# Patient Record
Sex: MALE | Race: WHITE | NOT HISPANIC OR LATINO | URBAN - METROPOLITAN AREA
[De-identification: names, ages, dates, MRNs, and addresses within clinical notes are randomized per-mention and may not be internally consistent; named-entity substitution may affect disease eponyms.]

---

## 2018-04-26 ENCOUNTER — EMERGENCY (EMERGENCY)
Facility: HOSPITAL | Age: 43
LOS: 1 days | End: 2018-04-26
Payer: COMMERCIAL

## 2018-04-26 PROCEDURE — 71046 X-RAY EXAM CHEST 2 VIEWS: CPT | Mod: 26

## 2018-04-26 PROCEDURE — 99284 EMERGENCY DEPT VISIT MOD MDM: CPT

## 2019-03-13 ENCOUNTER — TRANSCRIPTION ENCOUNTER (OUTPATIENT)
Age: 44
End: 2019-03-13

## 2019-12-07 ENCOUNTER — EMERGENCY (EMERGENCY)
Facility: HOSPITAL | Age: 44
LOS: 1 days | End: 2019-12-07
Admitting: EMERGENCY MEDICINE
Payer: OTHER GOVERNMENT

## 2019-12-07 PROCEDURE — 99284 EMERGENCY DEPT VISIT MOD MDM: CPT

## 2020-10-14 ENCOUNTER — TRANSCRIPTION ENCOUNTER (OUTPATIENT)
Age: 45
End: 2020-10-14

## 2020-10-21 ENCOUNTER — EMERGENCY (EMERGENCY)
Facility: HOSPITAL | Age: 45
LOS: 1 days | End: 2020-10-21
Admitting: EMERGENCY MEDICINE
Payer: OTHER GOVERNMENT

## 2020-10-21 PROCEDURE — 71275 CT ANGIOGRAPHY CHEST: CPT | Mod: 26

## 2020-10-21 PROCEDURE — 99285 EMERGENCY DEPT VISIT HI MDM: CPT

## 2020-10-21 PROCEDURE — 93010 ELECTROCARDIOGRAM REPORT: CPT

## 2020-10-21 PROCEDURE — 71045 X-RAY EXAM CHEST 1 VIEW: CPT | Mod: 26

## 2020-10-26 ENCOUNTER — EMERGENCY (EMERGENCY)
Facility: HOSPITAL | Age: 45
LOS: 1 days | End: 2020-10-26
Admitting: EMERGENCY MEDICINE
Payer: OTHER GOVERNMENT

## 2020-10-26 PROCEDURE — 93010 ELECTROCARDIOGRAM REPORT: CPT

## 2020-10-26 PROCEDURE — 99285 EMERGENCY DEPT VISIT HI MDM: CPT

## 2020-10-26 PROCEDURE — 99284 EMERGENCY DEPT VISIT MOD MDM: CPT

## 2020-10-26 PROCEDURE — 99053 MED SERV 10PM-8AM 24 HR FAC: CPT

## 2020-10-27 ENCOUNTER — APPOINTMENT (OUTPATIENT)
Dept: CARDIOLOGY | Facility: CLINIC | Age: 45
End: 2020-10-27
Payer: OTHER GOVERNMENT

## 2020-10-27 VITALS
SYSTOLIC BLOOD PRESSURE: 126 MMHG | TEMPERATURE: 98.2 F | OXYGEN SATURATION: 98 % | HEIGHT: 73 IN | HEART RATE: 94 BPM | WEIGHT: 213 LBS | DIASTOLIC BLOOD PRESSURE: 78 MMHG | BODY MASS INDEX: 28.23 KG/M2

## 2020-10-27 DIAGNOSIS — Z82.49 FAMILY HISTORY OF ISCHEMIC HEART DISEASE AND OTHER DISEASES OF THE CIRCULATORY SYSTEM: ICD-10-CM

## 2020-10-27 DIAGNOSIS — Z78.9 OTHER SPECIFIED HEALTH STATUS: ICD-10-CM

## 2020-10-27 PROBLEM — Z00.00 ENCOUNTER FOR PREVENTIVE HEALTH EXAMINATION: Status: ACTIVE | Noted: 2020-10-27

## 2020-10-27 PROCEDURE — 99244 OFF/OP CNSLTJ NEW/EST MOD 40: CPT

## 2020-10-27 PROCEDURE — 99072 ADDL SUPL MATRL&STAF TM PHE: CPT

## 2020-10-27 RX ORDER — SERTRALINE HYDROCHLORIDE 100 MG/1
100 TABLET, FILM COATED ORAL
Refills: 0 | Status: ACTIVE | COMMUNITY
Start: 2020-10-27

## 2020-10-27 NOTE — DISCUSSION/SUMMARY
[FreeTextEntry1] : Patient with anxiety and palpitations described as skipping beats.  At [facility] [place] in the ER, he was told that he has frequent PVCs.  He felt better after IV Lopressor and anxiolytics.\par \par I will start him with low-dose as needed metoprolol.  Check Holter recording for 48 hours\par \par Also, echocardiogram and ETT would be helpful.\par \par Follow-up after above tests.\par \par He may need low-dose long-term anxiolytic such as Klonopin other than Xanax for insomnia and anxiety related palpitations.  He will discuss this with Dr. Gallo\par \par Thank you for this referral and allowing me to participate in the care of this patient.  If can be of any further help or  if you have any questions, please do not hesitate to contact me\par \par \par Sincerely,\par \par Nick Huerta MD, FAC, [facility]

## 2020-10-27 NOTE — PHYSICAL EXAM
[General Appearance - Well Developed] : well developed [Normal Appearance] : normal appearance [Well Groomed] : well groomed [General Appearance - Well Nourished] : well nourished [No Deformities] : no deformities [General Appearance - In No Acute Distress] : no acute distress [Normal Conjunctiva] : the conjunctiva exhibited no abnormalities [Eyelids - No Xanthelasma] : the eyelids demonstrated no xanthelasmas [Normal Oral Mucosa] : normal oral mucosa [No Oral Pallor] : no oral pallor [No Oral Cyanosis] : no oral cyanosis [Heart Rate And Rhythm] : heart rate and rhythm were normal [Heart Sounds] : normal S1 and S2 [Murmurs] : no murmurs present [Arterial Pulses Normal] : the arterial pulses were normal [Edema] : no peripheral edema present [Respiration, Rhythm And Depth] : normal respiratory rhythm and effort [Exaggerated Use Of Accessory Muscles For Inspiration] : no accessory muscle use [Auscultation Breath Sounds / Voice Sounds] : lungs were clear to auscultation bilaterally [Abdomen Soft] : soft [Abdomen Tenderness] : non-tender [Abnormal Walk] : normal gait [Gait - Sufficient For Exercise Testing] : the gait was sufficient for exercise testing [Nail Clubbing] : no clubbing of the fingernails [Cyanosis, Localized] : no localized cyanosis [Skin Color & Pigmentation] : normal skin color and pigmentation [Skin Turgor] : normal skin turgor [] : no rash [Oriented To Time, Place, And Person] : oriented to person, place, and time [Affect] : the affect was normal [Mood] : the mood was normal [FreeTextEntry1] : He has anxiety

## 2020-10-27 NOTE — REASON FOR VISIT
[FreeTextEntry1] : Zeke is a 45-year-old male with history of anxiety and palpitations.  On 10/21/2020, he went to the ER at [facility] [place], [place] for significant increase in palpitations which he described as skipping heartbeats, every 5 to 10 seconds.  There was no associated lightheadedness, chest discomfort or shortness of breath.  Prior to going to the ER, he took Xanax which did not help.  In the ER, he was given IV Lopressor and Xanax which resulted in improvement in his palpitations.  EKG was unremarkable.  Labs are unremarkable except borderline white count.  CTA of the chest ruled out PE.\par \par No history of syncope with palpitations.\par \par Minimal coronary risk factors.\par \par He does not sleep well due to stress and anxiety.  He has cut back on caffeine.

## 2020-10-27 NOTE — ASSESSMENT
[FreeTextEntry1] : Reviewed today:\par -EKG 10/21/2020: Sinus rhythm, unremarkable\par -Labs 10/21/2020: White count 10.5, normal platelets.  D-dimer 300.  Negative BMP.  Normal magnesium and TSH.  Negative cardiac troponin.\par -CTA chest rule out PE.

## 2020-11-02 PROCEDURE — 99072 ADDL SUPL MATRL&STAF TM PHE: CPT

## 2020-11-02 PROCEDURE — 93224 XTRNL ECG REC UP TO 48 HRS: CPT

## 2020-11-18 ENCOUNTER — APPOINTMENT (OUTPATIENT)
Dept: CARDIOLOGY | Facility: CLINIC | Age: 45
End: 2020-11-18
Payer: OTHER GOVERNMENT

## 2020-11-18 PROCEDURE — 93015 CV STRESS TEST SUPVJ I&R: CPT

## 2020-11-18 PROCEDURE — 93306 TTE W/DOPPLER COMPLETE: CPT

## 2020-12-01 ENCOUNTER — APPOINTMENT (OUTPATIENT)
Dept: CARDIOLOGY | Facility: CLINIC | Age: 45
End: 2020-12-01
Payer: OTHER GOVERNMENT

## 2020-12-01 VITALS
OXYGEN SATURATION: 98 % | BODY MASS INDEX: 29.69 KG/M2 | WEIGHT: 224 LBS | DIASTOLIC BLOOD PRESSURE: 76 MMHG | HEIGHT: 73 IN | HEART RATE: 74 BPM | SYSTOLIC BLOOD PRESSURE: 110 MMHG | TEMPERATURE: 98 F

## 2020-12-01 PROCEDURE — 99072 ADDL SUPL MATRL&STAF TM PHE: CPT

## 2020-12-01 PROCEDURE — 99214 OFFICE O/P EST MOD 30 MIN: CPT

## 2020-12-01 RX ORDER — ALPRAZOLAM 0.5 MG/1
0.5 TABLET ORAL
Refills: 0 | Status: DISCONTINUED | COMMUNITY
Start: 2020-10-27 | End: 2020-12-01

## 2020-12-01 RX ORDER — ALPRAZOLAM 0.25 MG/1
0.25 TABLET ORAL
Qty: 60 | Refills: 0 | Status: ACTIVE | COMMUNITY
Start: 2020-10-08

## 2020-12-01 RX ORDER — TRAZODONE HYDROCHLORIDE 50 MG/1
50 TABLET ORAL
Qty: 30 | Refills: 0 | Status: ACTIVE | COMMUNITY
Start: 2020-09-10

## 2020-12-01 NOTE — DISCUSSION/SUMMARY
[FreeTextEntry1] : Patient with anxiety and palpitations described as skipping beats.  [place] [facility] [place] was told that he has frequent PVCs.  He felt better after IV Lopressor and anxiolytics.\par \par Continue with low-dose as needed metoprolol.  \par \par Echo showed LVH and dilated aortic root.  No history of hypertension.  Repeat echo in 6 months.  OV after that.  \par \par Suggested magnesium supplements.\par \par He may need low-dose long-term anxiolytic such as Klonopin other than Xanax for insomnia and anxiety related palpitations.  He will discuss this with Dr. Gallo\par \par Thank you for this referral and allowing me to participate in the care of this patient.  If can be of any further help or  if you have any questions, please do not hesitate to contact me\par \par \par Sincerely,\par \par Nick Huerta MD, FACC, [facility]

## 2020-12-01 NOTE — ASSESSMENT
[FreeTextEntry1] : Reviewed today:\par -EKG 10/21/2020: Sinus rhythm, unremarkable\par -Labs 10/21/2020: White count 10.5, normal platelets.  D-dimer 300.  Negative BMP.  Normal magnesium and TSH.  Negative cardiac troponin.\par -CTA chest ruled out PE.\par -Echo November 2020: Normal to hyperdynamic EF.  No MVP.  Aortic root 4.6 cm.  Normal diastolic function.  Mild LVH\par -Holter recording October 2020: Few PACs and PVCs.  Palpitations corresponded to PVC\par -ETT November 2020: Exercised 10 minutes.  No ischemia.  No arrhythmia except PVC in recovery

## 2020-12-01 NOTE — PHYSICAL EXAM
[General Appearance - Well Developed] : well developed [Normal Appearance] : normal appearance [Well Groomed] : well groomed [General Appearance - Well Nourished] : well nourished [No Deformities] : no deformities [General Appearance - In No Acute Distress] : no acute distress [Normal Conjunctiva] : the conjunctiva exhibited no abnormalities [Eyelids - No Xanthelasma] : the eyelids demonstrated no xanthelasmas [Normal Oral Mucosa] : normal oral mucosa [No Oral Pallor] : no oral pallor [No Oral Cyanosis] : no oral cyanosis [Respiration, Rhythm And Depth] : normal respiratory rhythm and effort [Exaggerated Use Of Accessory Muscles For Inspiration] : no accessory muscle use [Auscultation Breath Sounds / Voice Sounds] : lungs were clear to auscultation bilaterally [Heart Rate And Rhythm] : heart rate and rhythm were normal [Heart Sounds] : normal S1 and S2 [Murmurs] : no murmurs present [Arterial Pulses Normal] : the arterial pulses were normal [Edema] : no peripheral edema present [Abdomen Soft] : soft [Abdomen Tenderness] : non-tender [Abnormal Walk] : normal gait [Gait - Sufficient For Exercise Testing] : the gait was sufficient for exercise testing [Nail Clubbing] : no clubbing of the fingernails [Cyanosis, Localized] : no localized cyanosis [Skin Color & Pigmentation] : normal skin color and pigmentation [Skin Turgor] : normal skin turgor [] : no rash [Oriented To Time, Place, And Person] : oriented to person, place, and time [Affect] : the affect was normal [Mood] : the mood was normal [FreeTextEntry1] : He has anxiety

## 2020-12-01 NOTE — REASON FOR VISIT
[FreeTextEntry1] : Zeke is a 45-year-old male with history of anxiety and palpitations.  On 10/21/2020, he went to the ER at [facility] [place], [place] for significant increase in palpitations which he described as skipping heartbeats, every 5 to 10 seconds.  There was no associated lightheadedness, chest discomfort or shortness of breath.  Prior to going to the ER, he took Xanax which did not help.  In the ER, he was given IV Lopressor and Xanax which resulted in improvement in his palpitations.  EKG was unremarkable.  Labs are unremarkable except borderline white count.  CTA of the chest ruled out PE.\par \par No history of syncope with palpitations.  \par \par Palpitations have declined significantly since last office visit.  There were only few during his 24-hour Holter monitor in November 2020 and it corresponded to PVC activity.  He has good exercise capacity on ETT in November 2020 and no significant arrhythmias: Negative for ischemia\par \par Minimal coronary risk factors.\par \par He does not sleep well due to stress and anxiety.  He has cut back on caffeine.\par \par Echocardiogram showed normal LV function and wall motion, no MVP but dilated aortic root.

## 2021-05-25 ENCOUNTER — APPOINTMENT (OUTPATIENT)
Dept: CARDIOLOGY | Facility: CLINIC | Age: 46
End: 2021-05-25
Payer: OTHER GOVERNMENT

## 2021-05-25 VITALS
DIASTOLIC BLOOD PRESSURE: 88 MMHG | BODY MASS INDEX: 29.16 KG/M2 | HEART RATE: 83 BPM | SYSTOLIC BLOOD PRESSURE: 110 MMHG | HEIGHT: 73 IN | WEIGHT: 220 LBS | OXYGEN SATURATION: 98 %

## 2021-05-25 DIAGNOSIS — F41.1 GENERALIZED ANXIETY DISORDER: ICD-10-CM

## 2021-05-25 PROCEDURE — 93306 TTE W/DOPPLER COMPLETE: CPT

## 2021-05-25 PROCEDURE — 99214 OFFICE O/P EST MOD 30 MIN: CPT

## 2021-05-25 PROCEDURE — 99214 OFFICE O/P EST MOD 30 MIN: CPT | Mod: 25

## 2021-05-25 NOTE — PHYSICAL EXAM
[General Appearance - Well Developed] : well developed [Normal Appearance] : normal appearance [Well Groomed] : well groomed [General Appearance - Well Nourished] : well nourished [No Deformities] : no deformities [General Appearance - In No Acute Distress] : no acute distress [Normal Conjunctiva] : the conjunctiva exhibited no abnormalities [Eyelids - No Xanthelasma] : the eyelids demonstrated no xanthelasmas [Normal Oral Mucosa] : normal oral mucosa [No Oral Pallor] : no oral pallor [No Oral Cyanosis] : no oral cyanosis [Respiration, Rhythm And Depth] : normal respiratory rhythm and effort [Exaggerated Use Of Accessory Muscles For Inspiration] : no accessory muscle use [Auscultation Breath Sounds / Voice Sounds] : lungs were clear to auscultation bilaterally [Heart Rate And Rhythm] : heart rate and rhythm were normal [Heart Sounds] : normal S1 and S2 [Murmurs] : no murmurs present [Arterial Pulses Normal] : the arterial pulses were normal [Edema] : no peripheral edema present [Abdomen Soft] : soft [Abdomen Tenderness] : non-tender [Abnormal Walk] : normal gait [Gait - Sufficient For Exercise Testing] : the gait was sufficient for exercise testing [Nail Clubbing] : no clubbing of the fingernails [Cyanosis, Localized] : no localized cyanosis [Skin Color & Pigmentation] : normal skin color and pigmentation [] : no rash [Skin Turgor] : normal skin turgor [Oriented To Time, Place, And Person] : oriented to person, place, and time [Affect] : the affect was normal [Mood] : the mood was normal [FreeTextEntry1] : He has anxiety

## 2021-05-25 NOTE — ASSESSMENT
[FreeTextEntry1] : Reviewed today:\par -EKG 10/21/2020: Sinus rhythm, unremarkable\par -Labs 10/21/2020: White count 10.5, normal platelets.  D-dimer 300.  Negative BMP.  Normal magnesium and TSH.  Negative cardiac troponin.\par -CTA chest ruled out PE.\par -Echo November 2020: Normal to hyperdynamic EF.  No MVP.  Aortic root 4.6 cm.  Normal diastolic function.  Mild LVH\par -Holter recording October 2020: Few PACs and PVCs.  Palpitations corresponded to PVC\par -ETT November 2020: Exercised 10 minutes.  No ischemia.  No arrhythmia except PVC in recovery\par -Echocardiogram 5/25/2021: Normal EF.  Septum 1.2 cm.  Aortic  root 4.5 cm.

## 2021-05-25 NOTE — REASON FOR VISIT
[FreeTextEntry1] : Zeke is a 45-year-old male with history of anxiety and palpitations.  On 10/21/2020, he went [place] [facility] [place] in palpitations which he described as skipping heartbeats, every 5 to 10 seconds.  There was no associated lightheadedness, chest discomfort or shortness of breath.  Prior to going to the ER, he took Xanax which did not help.  In the ER, he was given IV Lopressor and Xanax which resulted in improvement in his palpitations.  EKG was unremarkable.  Labs are unremarkable except borderline white count.  CTA of the chest ruled out PE.\par \par No history of syncope with palpitations.  \par \par Palpitations have declined significantly since last office visit.  There were only few during his 24-hour Holter monitor in November 2020 and it corresponded to PVC activity.  He has good exercise capacity on ETT in November 2020 and no significant arrhythmias: Negative for ischemia\par \par Echo cardiogram on 5/25/2021 was unremarkable.  Aortic root size is 4.5 cm, unchanged.\par Minimal coronary risk factors.\par \par He does not sleep well due to stress and anxiety.  He has cut back on caffeine.\par \par Echocardiogram showed normal LV function and wall motion, no MVP but dilated aortic root.

## 2021-08-10 ENCOUNTER — TRANSCRIPTION ENCOUNTER (OUTPATIENT)
Age: 46
End: 2021-08-10

## 2022-05-09 ENCOUNTER — RESULT CHARGE (OUTPATIENT)
Age: 47
End: 2022-05-09

## 2022-05-10 ENCOUNTER — APPOINTMENT (OUTPATIENT)
Dept: CARDIOLOGY | Facility: CLINIC | Age: 47
End: 2022-05-10
Payer: OTHER GOVERNMENT

## 2022-05-10 VITALS
BODY MASS INDEX: 28.49 KG/M2 | DIASTOLIC BLOOD PRESSURE: 82 MMHG | OXYGEN SATURATION: 95 % | WEIGHT: 215 LBS | SYSTOLIC BLOOD PRESSURE: 124 MMHG | TEMPERATURE: 97.8 F | HEART RATE: 99 BPM | RESPIRATION RATE: 14 BRPM | HEIGHT: 73 IN

## 2022-05-10 DIAGNOSIS — Z82.49 FAMILY HISTORY OF ISCHEMIC HEART DISEASE AND OTHER DISEASES OF THE CIRCULATORY SYSTEM: ICD-10-CM

## 2022-05-10 PROCEDURE — 99214 OFFICE O/P EST MOD 30 MIN: CPT

## 2022-05-10 PROCEDURE — 93306 TTE W/DOPPLER COMPLETE: CPT

## 2022-05-10 PROCEDURE — 99214 OFFICE O/P EST MOD 30 MIN: CPT | Mod: 25

## 2022-05-10 RX ORDER — METOPROLOL TARTRATE 25 MG/1
25 TABLET, FILM COATED ORAL
Qty: 15 | Refills: 0 | Status: DISCONTINUED | COMMUNITY
Start: 2020-10-27 | End: 2022-05-10

## 2022-05-10 NOTE — REASON FOR VISIT
[FreeTextEntry1] : Zeke is a 46-year-old male with history of HLD, anxiety and palpitations.  \par \par Last Visit was 5/2021. Today May 10, 2022 he present for his yearly follow up and to review his Echo from today. He denies chest pain, pressure, unusual shortness of breath, SHARIF, orthopnea, LE edema, lightheadedness, dizziness, near syncope or syncope. He does continue to have palpitations however these have decreased in significance. He is taking his Metoprolol Tartrate daily and not prn anymore. \par No history of syncope with palpitations.  \par \par He has a significant family Hx of Premature CAD. Father MI at age 36\par \par Historical perspective:\par On 10/21/2020, he went to the ER at [facility] [place] significant increase in palpitations which he described as skipping heartbeats, every 5 to 10 seconds.  There was no associated lightheadedness, chest discomfort or shortness of breath.  Prior to going to the ER, he took Xanax which did not help.  In the ER, he was given IV Lopressor and Xanax which resulted in improvement in his palpitations.  EKG was unremarkable.\par   Labs are unremarkable except borderline white count.  CTA of the chest ruled out PE.\par \par \par \par \par

## 2022-05-10 NOTE — DISCUSSION/SUMMARY
[FreeTextEntry1] : CHAO LEE is a 46 year old M who presents today May 10, 2022 with the above history and the following active issues: \par \par Palpitations: will start on Metoprolol Succ 25mg daily. He was on Metop Tartrate prn but he was taking daily at night. Palps have decreased sig. with medication. I do believe Metop Succ will also improve with longer control. \par \par Hx Dilated Aortic Root\par On todays Echo Aortic root 4.6cm. Previous was 4.5cm.  It should be noted the patient is 6 foot 1 inch tall.\par On Echo today also MVP noted. BP is well controlled at todays visit  \par \par Keep a close watch on blood pressure.  Avoid heavy weight lifting or weight gain. \par \par HLD/Fam Hx CaD\par  on most recent labs. Start on Lipitor 20mg daily. Will obtain CT calcium score as well.\par Dietary modifications discussed in detail, weight loss and cardiovascular exercise. \par Repeat labs in 6 weeks\par Reviewed with patient most recent labs and testing. All questions answered. \par \par Follow up in 8 weeks- review labs, tolerance to statins and CT Calcium Score\par \par Thank you for this referral and allowing me to participate in the care of this patient.  If can be of any further help or  if you have any questions, please do not hesitate to contact me\par \par Sincerely,\par \par Alina Mcgee ANP-C\par Patients history, testing, and plan reviewed with supervising MD: Dr. Nick Huerta MD, [facility], [facility]

## 2022-05-10 NOTE — PHYSICAL EXAM
[General Appearance - Well Developed] : well developed [Normal Appearance] : normal appearance [Well Groomed] : well groomed [General Appearance - Well Nourished] : well nourished [No Deformities] : no deformities [General Appearance - In No Acute Distress] : no acute distress [Normal Oral Mucosa] : normal oral mucosa [No Oral Pallor] : no oral pallor [No Oral Cyanosis] : no oral cyanosis [Respiration, Rhythm And Depth] : normal respiratory rhythm and effort [Exaggerated Use Of Accessory Muscles For Inspiration] : no accessory muscle use [Auscultation Breath Sounds / Voice Sounds] : lungs were clear to auscultation bilaterally [Heart Rate And Rhythm] : heart rate and rhythm were normal [Heart Sounds] : normal S1 and S2 [Murmurs] : no murmurs present [Arterial Pulses Normal] : the arterial pulses were normal [Edema] : no peripheral edema present [Abnormal Walk] : normal gait [Gait - Sufficient For Exercise Testing] : the gait was sufficient for exercise testing [Nail Clubbing] : no clubbing of the fingernails [Cyanosis, Localized] : no localized cyanosis [Skin Color & Pigmentation] : normal skin color and pigmentation [Skin Turgor] : normal skin turgor [] : no rash [Oriented To Time, Place, And Person] : oriented to person, place, and time [Affect] : the affect was normal [Mood] : the mood was normal [FreeTextEntry1] : He has anxiety

## 2022-05-10 NOTE — CARDIOLOGY SUMMARY
[de-identified] : 10/2020: NSR 98 bpm [de-identified] : 10/2020 SR Avg 80 Max 116 bpm. PACs noted/PVCs [de-identified] : 11/2020 ETT: Exercise 10:00 11 METS No evidence for ischemia by EKG [de-identified] : 5/10/2022 EF 60-65%  Mitral valve prolapse involving the anterior mitral leaflet. Aortic Root 4.6cm Normal LV systolic function and no seg. wall motion abnormalities. Normal PASP. \par 5/2021: EF 60% Buckling MVL  Aortic root 4.5cm Normal LV systolic function and no seg. wall motion abnormalities. Normal PASP. \par 11/2020: EF >65% Aortic Root 4.6 Hyperdynamic LVSF. Mild LVH [de-identified] : Labs: 12/2021 Total Chol 229 Trig 186  A1C 5.9% TSH 0.914 HGB 14.8 HCT 44.9 Creat 0.91

## 2022-07-12 ENCOUNTER — APPOINTMENT (OUTPATIENT)
Dept: CARDIOLOGY | Facility: CLINIC | Age: 47
End: 2022-07-12

## 2022-07-12 VITALS
WEIGHT: 230 LBS | BODY MASS INDEX: 30.35 KG/M2 | DIASTOLIC BLOOD PRESSURE: 74 MMHG | SYSTOLIC BLOOD PRESSURE: 116 MMHG | TEMPERATURE: 97.8 F | OXYGEN SATURATION: 6 % | HEART RATE: 67 BPM

## 2022-07-12 PROCEDURE — 99213 OFFICE O/P EST LOW 20 MIN: CPT

## 2022-07-12 NOTE — DISCUSSION/SUMMARY
[FreeTextEntry1] : CHAO LEE is a 46 year old M who presents today with  the following active issues: \par \par Palpitations: cont Metoprolol Succ 25mg daily. Has sig decrease in palpitations. BP controlled on meds\par \par Hx Dilated Aortic Root\par Echo Aortic root 4.6cm. Previous was 4.5cm.  It should be noted the patient is 6 foot 1 inch tall.\par On Echo today also MVP noted. BP is well controlled at todays visit  \par \par Keep a close watch on blood pressure.  Avoid heavy weight lifting or weight gain. \par \par HLD/Fam Hx CaD\par LDL 62 on most recent labs. Cont Lipitor 20mg daily. Tolerating well. Ct Calcium Score was 0 from 4/2022\par Dietary modifications discussed in detail, weight loss and cardiovascular exercise. \par \par Thank you for this referral and allowing me to participate in the care of this patient.  If can be of any further help or  if you have any questions, please do not hesitate to contact me\par \par Return to office in 1 year for echocardiogram and follow-up or sooner if needed\par F/U after testing to review results.\par Discussed red flag symptoms, which would warrant sooner or emergent medical evaluation.\par Any questions and concerns were addressed and resolved. \par Sincerely,\par \par Alina MOEC\par Patients history, testing, and plan reviewed with supervising MD: Dr. Nick Huerta MD, [facility], [facility]

## 2022-07-12 NOTE — CARDIOLOGY SUMMARY
[de-identified] : 10/2020: NSR 98 bpm [de-identified] : 10/2020 SR Avg 80 Max 116 bpm. PACs noted/PVCs [de-identified] : 11/2020 ETT: Exercise 10:00 11 METS No evidence for ischemia by EKG [de-identified] : 5/10/2022 EF 60-65%  Mitral valve prolapse involving the anterior mitral leaflet. Aortic Root 4.6cm Normal LV systolic function and no seg. wall motion abnormalities. Normal PASP. \par 5/2021: EF 60% Buckling MVL  Aortic root 4.5cm Normal LV systolic function and no seg. wall motion abnormalities. Normal PASP. \par 11/2020: EF >65% Aortic Root 4.6 Hyperdynamic LVSF. Mild LVH [de-identified] : Ct Calcium Score 6/7/2022: Zero [de-identified] : Labs: 12/2021 Total Chol 229 Trig 186  A1C 5.9% TSH 0.914 HGB 14.8 HCT 44.9 Creat 0.91 \par Labs: 6/10/2022: Total cholesterol 150 triglycerides 205 LDL 62 A1c 5.8

## 2022-07-12 NOTE — PHYSICAL EXAM
[Normal] : clear lung fields, good air entry, no respiratory distress [Normal Gait] : normal gait [No Edema] : no edema [No Rash] : no rash [Moves all extremities] : moves all extremities [Normal Speech] : normal speech [Alert and Oriented] : alert and oriented

## 2022-07-12 NOTE — REASON FOR VISIT
[FreeTextEntry1] : Zeke is a 46-year-old male with history of HLD, anxiety and palpitations.  \par \par Last Visit was  May 10, 2022. Today he present for a follow up after starting Metoprolol Succ daily for his palpitations. He denies chest pain, pressure, unusual shortness of breath, SHARIF, orthopnea, LE edema, lightheadedness, dizziness, near syncope or syncope. He does continue to have intermittent palpitations however these have decreased in significance.\par He was also started on atorvastatin which she is tolerating well.  Repeat lipid profile showing significant decrease in LDL.\par \par No history of syncope with palpitations.  \par \par He has a significant family Hx of Premature CAD. Father MI at age 36\par \par Historical perspective:\par On 10/21/2020, he went to the ER at [facility] [place] for significant increase in palpitations which he described as skipping heartbeats, every 5 to 10 seconds.  There was no associated lightheadedness, chest discomfort or shortness of breath.  Prior to going to the ER, he took Xanax which did not help.  In the ER, he was given IV Lopressor and Xanax which resulted in improvement in his palpitations.  EKG was unremarkable.\par   Labs are unremarkable except borderline white count.  CTA of the chest ruled out PE.

## 2023-01-17 ENCOUNTER — NON-APPOINTMENT (OUTPATIENT)
Age: 48
End: 2023-01-17

## 2023-02-03 RX ORDER — METOPROLOL SUCCINATE 25 MG/1
25 TABLET, EXTENDED RELEASE ORAL DAILY
Qty: 90 | Refills: 3 | Status: ACTIVE | COMMUNITY
Start: 2022-05-10 | End: 1900-01-01

## 2023-03-30 ENCOUNTER — NON-APPOINTMENT (OUTPATIENT)
Age: 48
End: 2023-03-30

## 2023-04-27 ENCOUNTER — APPOINTMENT (OUTPATIENT)
Dept: INFECTIOUS DISEASE | Facility: CLINIC | Age: 48
End: 2023-04-27
Payer: OTHER GOVERNMENT

## 2023-04-27 VITALS
SYSTOLIC BLOOD PRESSURE: 130 MMHG | OXYGEN SATURATION: 95 % | DIASTOLIC BLOOD PRESSURE: 90 MMHG | BODY MASS INDEX: 31.14 KG/M2 | HEART RATE: 80 BPM | TEMPERATURE: 99.1 F | HEIGHT: 73 IN | WEIGHT: 235 LBS

## 2023-04-27 DIAGNOSIS — M70.32 OTHER BURSITIS OF ELBOW, LEFT ELBOW: ICD-10-CM

## 2023-04-27 DIAGNOSIS — Z82.49 FAMILY HISTORY OF ISCHEMIC HEART DISEASE AND OTHER DISEASES OF THE CIRCULATORY SYSTEM: ICD-10-CM

## 2023-04-27 DIAGNOSIS — Z86.59 PERSONAL HISTORY OF OTHER MENTAL AND BEHAVIORAL DISORDERS: ICD-10-CM

## 2023-04-27 DIAGNOSIS — Z83.438 FAMILY HISTORY OF OTHER DISORDER OF LIPOPROTEIN METABOLISM AND OTHER LIPIDEMIA: ICD-10-CM

## 2023-04-27 DIAGNOSIS — Z78.9 OTHER SPECIFIED HEALTH STATUS: ICD-10-CM

## 2023-04-27 DIAGNOSIS — L73.9 FOLLICULAR DISORDER, UNSPECIFIED: ICD-10-CM

## 2023-04-27 DIAGNOSIS — Z87.09 PERSONAL HISTORY OF OTHER DISEASES OF THE RESPIRATORY SYSTEM: ICD-10-CM

## 2023-04-27 PROCEDURE — 99213 OFFICE O/P EST LOW 20 MIN: CPT

## 2023-04-27 NOTE — PHYSICAL EXAM
[General Appearance - Alert] : alert [General Appearance - In No Acute Distress] : in no acute distress [PERRL With Normal Accommodation] : pupils were equal in size, round, reactive to light [Sclera] : the sclera and conjunctiva were normal [Extraocular Movements] : extraocular movements were intact [Outer Ear] : the ears and nose were normal in appearance [Oropharynx] : the oropharynx was normal with no thrush [Neck Appearance] : the appearance of the neck was normal [Neck Cervical Mass (___cm)] : no neck mass was observed [Jugular Venous Distention Increased] : there was no jugular-venous distention [Thyroid Diffuse Enlargement] : the thyroid was not enlarged [Auscultation Breath Sounds / Voice Sounds] : lungs were clear to auscultation bilaterally [Heart Rate And Rhythm] : heart rate was normal and rhythm regular [Heart Sounds] : normal S1 and S2 [Heart Sounds Gallop] : no gallops [Murmurs] : no murmurs [Heart Sounds Pericardial Friction Rub] : no pericardial rub [Full Pulse] : the pedal pulses are present [Edema] : there was no peripheral edema [Bowel Sounds] : normal bowel sounds [Abdomen Soft] : soft [Abdomen Tenderness] : non-tender [Abdomen Mass (___ Cm)] : no abdominal mass palpated [Costovertebral Angle Tenderness] : no CVA tenderness [No Palpable Adenopathy] : no palpable adenopathy [Musculoskeletal - Swelling] : no joint swelling [Nail Clubbing] : no clubbing  or cyanosis of the fingernails [Motor Tone] : muscle strength and tone were normal [Skin Color & Pigmentation] : normal skin color and pigmentation [] : no rash [FreeTextEntry1] : Left elbow with slight fullness, mildly tender to palpation.  On the bilateral corners of the mouth there is a little bit of fissuring, and fullness with induration. [Deep Tendon Reflexes (DTR)] : deep tendon reflexes were 2+ and symmetric [Sensation] : the sensory exam was normal to light touch and pinprick [No Focal Deficits] : no focal deficits [Oriented To Time, Place, And Person] : oriented to person, place, and time [Affect] : the affect was normal

## 2023-04-27 NOTE — ASSESSMENT
[FreeTextEntry1] : This 47-year-old\par \par Man who had been treated for a left elbow cellulitis, folliculitis.  I explained to the patient that he still has some signs of a follicular inflammation around the corners of the mouth.  Which might be related to his close shaving leading to his ingrown hairs.  Explained that ingrown hairs do have a sharp edge and cuts into the skin.\par \par I have advised the patient that I will extend his duration of the doxycycline that he had in the hospital for another 2 weeks.\par \par I have advised him of sun protection while on doxycycline.\par \par I also advised the patient to use a chlorhexidine/Hibiclens solution during showering to help decolonize of the staph bacteria and suspected MRSA.\par \par \par He can follow-up this office as needed.

## 2023-04-27 NOTE — HISTORY OF PRESENT ILLNESS
[FreeTextEntry1] : This is a 47-year-old man that was seen in the hospital last seen on April 6, 2023.  He had left elbow cellulitis, anxiety on SSRI left olecranon bursitis patient MRSA colonization.  He was supposed to complete 2 more days of clindamycin and doxycycline for 7 more days ending on April 5, 2023.\par \par Since the visit in the hospital, patient reports that his elbow feels generally better.  Its not completely normal.  When his elbow is bent it and he touches his elbow on the left side there is still some pain.  But when it is fully extended, there is less pain to almost no pain on palpation of the elbow.\par \par No fevers are noted.\par His left and right corner of the mouth are low but worse in the hospital.  He reports that he has ingrown hairs at the area from his close shaving.\par \par

## 2023-05-01 PROBLEM — Z83.438 FAMILY HISTORY OF HYPERLIPIDEMIA: Status: ACTIVE | Noted: 2020-10-27

## 2023-05-01 PROBLEM — Z78.9 SOCIAL ALCOHOL USE: Status: ACTIVE | Noted: 2020-10-27

## 2023-05-01 PROBLEM — Z87.09 HISTORY OF ASTHMA: Status: RESOLVED | Noted: 2023-04-27 | Resolved: 2023-05-01

## 2023-05-01 PROBLEM — Z86.59 HISTORY OF ANXIETY: Status: RESOLVED | Noted: 2023-04-27 | Resolved: 2023-05-01

## 2023-05-01 PROBLEM — Z86.59 HISTORY OF DEPRESSION: Status: RESOLVED | Noted: 2023-04-27 | Resolved: 2023-05-01

## 2023-05-01 PROBLEM — Z82.49 FAMILY HISTORY OF CARDIAC DISORDER: Status: ACTIVE | Noted: 2023-04-27

## 2023-07-11 ENCOUNTER — APPOINTMENT (OUTPATIENT)
Dept: CARDIOLOGY | Facility: CLINIC | Age: 48
End: 2023-07-11
Payer: OTHER GOVERNMENT

## 2023-07-11 PROCEDURE — 93306 TTE W/DOPPLER COMPLETE: CPT

## 2023-08-29 ENCOUNTER — APPOINTMENT (OUTPATIENT)
Dept: CARDIOLOGY | Facility: CLINIC | Age: 48
End: 2023-08-29
Payer: OTHER GOVERNMENT

## 2023-08-29 VITALS
HEIGHT: 73 IN | RESPIRATION RATE: 14 BRPM | SYSTOLIC BLOOD PRESSURE: 144 MMHG | DIASTOLIC BLOOD PRESSURE: 90 MMHG | BODY MASS INDEX: 30.75 KG/M2 | HEART RATE: 75 BPM | WEIGHT: 232 LBS | OXYGEN SATURATION: 98 %

## 2023-08-29 PROCEDURE — 99214 OFFICE O/P EST MOD 30 MIN: CPT

## 2023-08-29 PROCEDURE — 93000 ELECTROCARDIOGRAM COMPLETE: CPT

## 2023-08-29 RX ORDER — DOXYCYCLINE 100 MG/1
100 TABLET, FILM COATED ORAL
Qty: 28 | Refills: 0 | Status: DISCONTINUED | COMMUNITY
Start: 2023-04-27 | End: 2023-08-29

## 2023-08-29 NOTE — REASON FOR VISIT
[FreeTextEntry1] : Zeke is a 48-year-old male with history of HLD, anxiety and palpitations.    He denies chest pain, pressure, unusual shortness of breath, SHARIF, orthopnea, LE edema, lightheadedness, dizziness, near syncope or syncope. He does continue to have intermittent palpitations however these have decreased with beta-blockers  He was also started on atorvastatin which he is tolerating well.  Repeat lipid profile showing significant decrease in LDL.  No history of syncope with palpitations.    He has a significant family Hx of Premature CAD. Father MI at age 36  Historical perspective: On 10/21/2020, he [facility] significant increase in palpitations which he described as skipping heartbeats, every 5 to 10 seconds.  There was no associated lightheadedness, chest discomfort or shortness of breath.  Prior to going to the ER, he took Xanax which did not help.  In the ER, he was given IV Lopressor and Xanax which resulted in improvement in his palpitations.  EKG was unremarkable. CTA of the chest ruled out PE.  He had echo in July 2023: Aortic root was 4.6 cm.  Normal EF.

## 2023-08-29 NOTE — DISCUSSION/SUMMARY
[FreeTextEntry1] : CHAO LEE is a 48 year old M who presents today with  the following active issues:   Palpitations: cont Metoprolol Succ 25mg daily.  Almost resolved.   BP controlled on meds  Hx Dilated Aortic Root; Echo Aortic root was 4.6cm. It should be noted the patient is 6 foot 1 inch tall. Also MVP noted. Follow-up echocardiogram in July 2023 showed unchanged aortic root at 4.6 cm.  Normal EF.  Prolapse anterior mitral valve leaflet with mild MR.  Keep a close watch on blood pressure.  Avoid heavy weight lifting or weight gain.   HLD/Fam Hx CaD LDL 62 on previous labs. Cont Lipitor 20mg daily. Tolerating well.   Ct Calcium Score was 0 from 4/2022 Dietary modifications discussed in detail, weight loss and cardiovascular exercise.   Follow-up after 1 year   Thank you for this referral and allowing me to participate in the care of this patient.  If I can be of any further help or  if you have any questions, please do not hesitate to contact me   Sincerely,  Nick Huerta MD, FACC, RAMOS

## 2023-08-29 NOTE — CARDIOLOGY SUMMARY
[de-identified] : 10/2020: NSR 98 bpm [de-identified] : 10/2020 SR Avg 80 Max 116 bpm. PACs noted/PVCs [de-identified] : 11/2020 ETT: Exercise 10:00 11 METS No evidence for ischemia by EKG [de-identified] : 5/10/2022 EF 60-65%  Mitral valve prolapse involving the anterior mitral leaflet. Aortic Root 4.6cm Normal LV systolic function and no seg. wall motion abnormalities. Normal PASP. \par  5/2021: EF 60% Buckling MVL  Aortic root 4.5cm Normal LV systolic function and no seg. wall motion abnormalities. Normal PASP. \par  11/2020: EF >65% Aortic Root 4.6 Hyperdynamic LVSF. Mild LVH [de-identified] : Ct Calcium Score 6/7/2022: Zero [de-identified] : Labs: 12/2021 Total Chol 229 Trig 186  A1C 5.9% TSH 0.914 HGB 14.8 HCT 44.9 Creat 0.91 \par  Labs: 6/10/2022: Total cholesterol 150 triglycerides 205 LDL 62 A1c 5.8

## 2024-01-29 ENCOUNTER — RX RENEWAL (OUTPATIENT)
Age: 49
End: 2024-01-29

## 2024-03-16 ENCOUNTER — RX RENEWAL (OUTPATIENT)
Age: 49
End: 2024-03-16

## 2024-04-08 DIAGNOSIS — M50.20 OTHER CERVICAL DISC DISPLACEMENT, UNSPECIFIED CERVICAL REGION: ICD-10-CM

## 2024-04-23 ENCOUNTER — OUTPATIENT (OUTPATIENT)
Dept: OUTPATIENT SERVICES | Facility: HOSPITAL | Age: 49
LOS: 1 days | End: 2024-04-23
Payer: OTHER GOVERNMENT

## 2024-04-23 VITALS
TEMPERATURE: 98 F | RESPIRATION RATE: 14 BRPM | DIASTOLIC BLOOD PRESSURE: 90 MMHG | WEIGHT: 225.09 LBS | OXYGEN SATURATION: 97 % | HEIGHT: 73 IN | SYSTOLIC BLOOD PRESSURE: 141 MMHG | HEART RATE: 93 BPM

## 2024-04-23 DIAGNOSIS — M50.20 OTHER CERVICAL DISC DISPLACEMENT, UNSPECIFIED CERVICAL REGION: ICD-10-CM

## 2024-04-23 DIAGNOSIS — Z98.890 OTHER SPECIFIED POSTPROCEDURAL STATES: Chronic | ICD-10-CM

## 2024-04-23 DIAGNOSIS — Z01.818 ENCOUNTER FOR OTHER PREPROCEDURAL EXAMINATION: ICD-10-CM

## 2024-04-23 LAB
ALBUMIN SERPL ELPH-MCNC: 4.2 G/DL — SIGNIFICANT CHANGE UP (ref 3.3–5)
ALP SERPL-CCNC: 122 U/L — HIGH (ref 30–120)
ALT FLD-CCNC: 65 U/L — HIGH (ref 10–60)
ANION GAP SERPL CALC-SCNC: 7 MMOL/L — SIGNIFICANT CHANGE UP (ref 5–17)
APTT BLD: 32.4 SEC — SIGNIFICANT CHANGE UP (ref 24.5–35.6)
AST SERPL-CCNC: 24 U/L — SIGNIFICANT CHANGE UP (ref 10–40)
BILIRUB SERPL-MCNC: 0.5 MG/DL — SIGNIFICANT CHANGE UP (ref 0.2–1.2)
BLD GP AB SCN SERPL QL: SIGNIFICANT CHANGE UP
BUN SERPL-MCNC: 18 MG/DL — SIGNIFICANT CHANGE UP (ref 7–23)
CALCIUM SERPL-MCNC: 9.2 MG/DL — SIGNIFICANT CHANGE UP (ref 8.4–10.5)
CHLORIDE SERPL-SCNC: 106 MMOL/L — SIGNIFICANT CHANGE UP (ref 96–108)
CO2 SERPL-SCNC: 31 MMOL/L — SIGNIFICANT CHANGE UP (ref 22–31)
CREAT SERPL-MCNC: 1.08 MG/DL — SIGNIFICANT CHANGE UP (ref 0.5–1.3)
EGFR: 85 ML/MIN/1.73M2 — SIGNIFICANT CHANGE UP
GLUCOSE SERPL-MCNC: 113 MG/DL — HIGH (ref 70–99)
HCT VFR BLD CALC: 46.1 % — SIGNIFICANT CHANGE UP (ref 39–50)
HGB BLD-MCNC: 14.9 G/DL — SIGNIFICANT CHANGE UP (ref 13–17)
INR BLD: 1.1 RATIO — SIGNIFICANT CHANGE UP (ref 0.85–1.18)
MCHC RBC-ENTMCNC: 26.8 PG — LOW (ref 27–34)
MCHC RBC-ENTMCNC: 32.3 GM/DL — SIGNIFICANT CHANGE UP (ref 32–36)
MCV RBC AUTO: 82.9 FL — SIGNIFICANT CHANGE UP (ref 80–100)
NRBC # BLD: 0 /100 WBCS — SIGNIFICANT CHANGE UP (ref 0–0)
PLATELET # BLD AUTO: 315 K/UL — SIGNIFICANT CHANGE UP (ref 150–400)
POTASSIUM SERPL-MCNC: 3.8 MMOL/L — SIGNIFICANT CHANGE UP (ref 3.5–5.3)
POTASSIUM SERPL-SCNC: 3.8 MMOL/L — SIGNIFICANT CHANGE UP (ref 3.5–5.3)
PROT SERPL-MCNC: 7.6 G/DL — SIGNIFICANT CHANGE UP (ref 6–8.3)
PROTHROM AB SERPL-ACNC: 12 SEC — SIGNIFICANT CHANGE UP (ref 9.5–13)
RBC # BLD: 5.56 M/UL — SIGNIFICANT CHANGE UP (ref 4.2–5.8)
RBC # FLD: 14.1 % — SIGNIFICANT CHANGE UP (ref 10.3–14.5)
SODIUM SERPL-SCNC: 144 MMOL/L — SIGNIFICANT CHANGE UP (ref 135–145)
WBC # BLD: 6.31 K/UL — SIGNIFICANT CHANGE UP (ref 3.8–10.5)
WBC # FLD AUTO: 6.31 K/UL — SIGNIFICANT CHANGE UP (ref 3.8–10.5)

## 2024-04-23 PROCEDURE — 85610 PROTHROMBIN TIME: CPT

## 2024-04-23 PROCEDURE — 93005 ELECTROCARDIOGRAM TRACING: CPT

## 2024-04-23 PROCEDURE — 85730 THROMBOPLASTIN TIME PARTIAL: CPT

## 2024-04-23 PROCEDURE — 80053 COMPREHEN METABOLIC PANEL: CPT

## 2024-04-23 PROCEDURE — 83036 HEMOGLOBIN GLYCOSYLATED A1C: CPT

## 2024-04-23 PROCEDURE — G0463: CPT

## 2024-04-23 PROCEDURE — 36415 COLL VENOUS BLD VENIPUNCTURE: CPT

## 2024-04-23 PROCEDURE — 86901 BLOOD TYPING SEROLOGIC RH(D): CPT

## 2024-04-23 PROCEDURE — 85027 COMPLETE CBC AUTOMATED: CPT

## 2024-04-23 PROCEDURE — 93010 ELECTROCARDIOGRAM REPORT: CPT

## 2024-04-23 PROCEDURE — 86900 BLOOD TYPING SEROLOGIC ABO: CPT

## 2024-04-23 PROCEDURE — 86850 RBC ANTIBODY SCREEN: CPT

## 2024-04-23 NOTE — H&P PST ADULT - NSICDXPASTMEDICALHX_GEN_ALL_CORE_FT
PAST MEDICAL HISTORY:  Allergy to animal dander     Anxiety     Cervical disc displacement     Childhood asthma     COVID-19 vaccine series completed     DDD (degenerative disc disease), cervical     Enlargement of aortic root     Environmental allergies     History of palpitations     History of tachycardia     Insomnia     Low back pain     Mild depression     Moderate obstructive sleep apnea     Panic attacks     Primary osteoarthritis of left shoulder     Sciatic pain, right     Snoring

## 2024-04-23 NOTE — H&P PST ADULT - NSICDXFAMILYHX_GEN_ALL_CORE_FT
FAMILY HISTORY:  Father  Still living? No  FH: early coronary artery disease, Age at diagnosis: Age Unknown    Mother  Still living? Yes, Estimated age: 71-80  Family history of hypertension, Age at diagnosis: Age Unknown

## 2024-04-23 NOTE — H&P PST ADULT - MUSCULOSKELETAL
details… left shoulder/no joint swelling/no joint erythema/no joint warmth/no calf tenderness/decreased ROM/decreased ROM due to pain/strength 5/5 bilateral lower extremities/back exam/decreased strength

## 2024-04-23 NOTE — H&P PST ADULT - PSYCHIATRIC
normal... Well appearing, well nourished, awake, alert, oriented to person, place, time/situation and in no apparent distress. normal/normal affect/alert and oriented x3/normal behavior negative

## 2024-04-23 NOTE — H&P PST ADULT - PROBLEM SELECTOR PLAN 1
total disc replacement C5-6 and C6-7, possible anterior discectomy and fusion C5-6, C6-7 or iliac crest graft harvesting. medical and cardiac clearances requested. obtain recent cardiac testing. instructed to stop MVI, fish oil and motrin 2 weeks prior to surgery. may take alprazolam AM of surgery if needed

## 2024-04-23 NOTE — H&P PST ADULT - BP NONINVASIVE MEAN (MM HG)
Your exam was normal today, and you continue to heal well from your surgery.  You may see small parts of the dissolvable suture still in the next week or two.  At this time, your restrictions have been lifted and you may resume any activities you would like. We recommend you gradually increase your physical activity. As Dr. Ellison mentioned, that may mean not lifting more than 10-20 lbs over the next month.     You may start physical therapy and it need not be with a pelvic floor physical therapist.     To maintain good pelvic floor muscle strength, please do your kegel exercises as instructed.  Written information is attached for your review.    Thank you for letting us take care of you.  We are available if any future concerns arise.   ________________________________________________________________     PELVIC FLOOR EXERCISES    The pelvic floor muscles run like a hammock from the pubic bone in front to the tailbone in the back, and between the sitting bones.    Purpose  Pelvic floor muscle exercises:  • Give support to the bladder, vagina, uterus and rectum, and help prevent prolapse (a falling out of your pelvic organs). These exercises are especially important for women during pregnancy but are important for your whole life.  • Control bladder and bowel, preventing leakage of urine or feces.  • Improve sexual sensation, erection and orgasm.  • Help stabilize the pelvis and the spine during movement and exercise.    Try any of these techniques to find your pelvic floor muscles:  • Squeeze the muscles around your vagina and anus as if to hold back gas. (Pretend you are in a quiet but crowded room. No one should be able to see what you are doing. If you are holding your buttocks or bulging your abdominals, try with less force.) The abdominal muscles will gently pull in if you are doing the pelvic floor exercise right.  • Insert a finger into the vagina or anus and try to squeeze the pelvic floor muscles around it,  pull up and in.  • Tighten your pelvic floor muscles during intercourse.  • You can also use a hand mirror to see closure of the vaginal or anal opening while lying down or standing.  • Try stopping your urine midstream. It may take several seconds, but you should be able to completely stop the stream. Finish urinating.  Never do this repeatedly as an exercise; it only helps you locate the pelvic floor muscles.    Once you find your pelvic floor muscles, progress to these exercises:  • Begin by lying down. Squeeze your pelvic floor muscles tightly and draw up and in. Hold for five to 10 seconds and then rest for 10 seconds. Work up to 20 contractions three times a day. Be sure to stop the exercise session if you can no longer hold for the five or 10 second count.  • Progress to sitting. Once you can easily finish 20 pelvic floor muscle contractions lying down, you are ready to start sitting. Work up to 20 contractions, three times a day when sitting.  • Advance to standing. Once 20 repetitions three times a day of seated pelvic floor muscle exercises are easy, you may progress to standing and work up to 20 repetitions three times a day.  • Try even more advanced pelvic floor muscle exercises. Contract the muscles during squats or lunges, or trying to get up from a chair 20 times.  • You can even add the exercises to a stability ball or Pilates routine, which is very advanced. As with any exercise program, be patient and persistent. These exercises should not cause any pain. It may take two to three months to progress through all of the levels of difficulty. A physical therapist or occupational therapist with special training can help you if you can’t find the muscles or aren’t seeing any improvement. Please speak  with your health care provider for a referral.       107

## 2024-04-23 NOTE — H&P PST ADULT - NSALCOHOLAMT_GEN_A_CORE_SD
What Type Of Note Output Would You Prefer (Optional)?: Standard Output What Is The Reason For Today's Visit?: Full Body Skin Examination 1-2 drinks

## 2024-04-23 NOTE — H&P PST ADULT - HISTORY OF PRESENT ILLNESS
49 yo male presents s/p fall on his left shoulder in 1999. He states that he thought he had a pinched nerve in the neck at that time but never had any treatment. 10 years ago he started to have decreased ROM in the neck but again never had any treatment. In 2022 he developed nerve pain in the left shoulder and was treated with PT, epidural injections and trigger point injections. States that pain felt better during PT sessions but immediately recurred. Good relief with injections. Now reports continued decreased ROM, pain on the left side of the neck and left arm pain. Also reports intermittent numbness and tingling in the left arm. Pain moderately relieved with repositioning the neck. Mild relief with motrin.

## 2024-04-24 LAB
A1C WITH ESTIMATED AVERAGE GLUCOSE RESULT: 5.7 % — HIGH (ref 4–5.6)
ESTIMATED AVERAGE GLUCOSE: 117 MG/DL — HIGH (ref 68–114)

## 2024-04-29 ENCOUNTER — APPOINTMENT (OUTPATIENT)
Dept: CARDIOLOGY | Facility: CLINIC | Age: 49
End: 2024-04-29
Payer: OTHER GOVERNMENT

## 2024-04-29 VITALS
WEIGHT: 230 LBS | SYSTOLIC BLOOD PRESSURE: 118 MMHG | HEIGHT: 73 IN | DIASTOLIC BLOOD PRESSURE: 72 MMHG | HEART RATE: 90 BPM | BODY MASS INDEX: 30.48 KG/M2 | OXYGEN SATURATION: 97 % | RESPIRATION RATE: 14 BRPM

## 2024-04-29 DIAGNOSIS — I77.810 THORACIC AORTIC ECTASIA: ICD-10-CM

## 2024-04-29 DIAGNOSIS — G47.30 SLEEP APNEA, UNSPECIFIED: ICD-10-CM

## 2024-04-29 DIAGNOSIS — I49.9 CARDIAC ARRHYTHMIA, UNSPECIFIED: ICD-10-CM

## 2024-04-29 DIAGNOSIS — I51.7 CARDIOMEGALY: ICD-10-CM

## 2024-04-29 DIAGNOSIS — E78.5 HYPERLIPIDEMIA, UNSPECIFIED: ICD-10-CM

## 2024-04-29 DIAGNOSIS — R00.2 PALPITATIONS: ICD-10-CM

## 2024-04-29 PROBLEM — R06.83 SNORING: Chronic | Status: ACTIVE | Noted: 2024-04-23

## 2024-04-29 PROBLEM — M50.20 OTHER CERVICAL DISC DISPLACEMENT, UNSPECIFIED CERVICAL REGION: Chronic | Status: ACTIVE | Noted: 2024-04-23

## 2024-04-29 PROBLEM — J30.81 ALLERGIC RHINITIS DUE TO ANIMAL (CAT) (DOG) HAIR AND DANDER: Chronic | Status: ACTIVE | Noted: 2024-04-23

## 2024-04-29 PROBLEM — F32.A DEPRESSION, UNSPECIFIED: Chronic | Status: ACTIVE | Noted: 2024-04-23

## 2024-04-29 PROBLEM — Z87.898 PERSONAL HISTORY OF OTHER SPECIFIED CONDITIONS: Chronic | Status: ACTIVE | Noted: 2024-04-23

## 2024-04-29 PROBLEM — M54.31 SCIATICA, RIGHT SIDE: Chronic | Status: ACTIVE | Noted: 2024-04-23

## 2024-04-29 PROBLEM — I77.89 OTHER SPECIFIED DISORDERS OF ARTERIES AND ARTERIOLES: Chronic | Status: ACTIVE | Noted: 2024-04-23

## 2024-04-29 PROBLEM — G47.33 OBSTRUCTIVE SLEEP APNEA (ADULT) (PEDIATRIC): Chronic | Status: ACTIVE | Noted: 2024-04-23

## 2024-04-29 PROBLEM — M50.30 OTHER CERVICAL DISC DEGENERATION, UNSPECIFIED CERVICAL REGION: Chronic | Status: ACTIVE | Noted: 2024-04-23

## 2024-04-29 PROBLEM — G47.00 INSOMNIA, UNSPECIFIED: Chronic | Status: ACTIVE | Noted: 2024-04-23

## 2024-04-29 PROBLEM — M19.012 PRIMARY OSTEOARTHRITIS, LEFT SHOULDER: Chronic | Status: ACTIVE | Noted: 2024-04-23

## 2024-04-29 PROBLEM — Z91.09 OTHER ALLERGY STATUS, OTHER THAN TO DRUGS AND BIOLOGICAL SUBSTANCES: Chronic | Status: ACTIVE | Noted: 2024-04-23

## 2024-04-29 PROBLEM — F41.0 PANIC DISORDER [EPISODIC PAROXYSMAL ANXIETY]: Chronic | Status: ACTIVE | Noted: 2024-04-23

## 2024-04-29 PROBLEM — Z92.29 PERSONAL HISTORY OF OTHER DRUG THERAPY: Chronic | Status: ACTIVE | Noted: 2024-04-23

## 2024-04-29 PROBLEM — M54.50 LOW BACK PAIN, UNSPECIFIED: Chronic | Status: ACTIVE | Noted: 2024-04-23

## 2024-04-29 PROBLEM — J45.909 UNSPECIFIED ASTHMA, UNCOMPLICATED: Chronic | Status: ACTIVE | Noted: 2024-04-23

## 2024-04-29 PROBLEM — F41.9 ANXIETY DISORDER, UNSPECIFIED: Chronic | Status: ACTIVE | Noted: 2024-04-23

## 2024-04-29 PROCEDURE — 99214 OFFICE O/P EST MOD 30 MIN: CPT

## 2024-04-29 PROCEDURE — 93242 EXT ECG>48HR<7D RECORDING: CPT

## 2024-04-29 PROCEDURE — G2211 COMPLEX E/M VISIT ADD ON: CPT

## 2024-04-29 NOTE — CARDIOLOGY SUMMARY
[de-identified] : 10/2020: NSR 98 bpm [de-identified] : 10/2020 SR Avg 80 Max 116 bpm. PACs noted/PVCs [de-identified] : 11/2020 ETT: Exercise 10:00 11 METS No evidence for ischemia by EKG [de-identified] : 5/10/2022 EF 60-65%  Mitral valve prolapse involving the anterior mitral leaflet. Aortic Root 4.6cm Normal LV systolic function and no seg. wall motion abnormalities. Normal PASP. \par  5/2021: EF 60% Buckling MVL  Aortic root 4.5cm Normal LV systolic function and no seg. wall motion abnormalities. Normal PASP. \par  11/2020: EF >65% Aortic Root 4.6 Hyperdynamic LVSF. Mild LVH [de-identified] : Ct Calcium Score 6/7/2022: Zero [de-identified] : Labs: 12/2021 Total Chol 229 Trig 186  A1C 5.9% TSH 0.914 HGB 14.8 HCT 44.9 Creat 0.91 \par  Labs: 6/10/2022: Total cholesterol 150 triglycerides 205 LDL 62 A1c 5.8

## 2024-04-29 NOTE — REASON FOR VISIT
[FreeTextEntry1] : Zeke is a 48-year-old male with history of HLD, anxiety and palpitations.    He denies chest pain, pressure, unusual shortness of breath, SHARIF, orthopnea, LE edema, lightheadedness, dizziness, near syncope or syncope.  He is fairly active with good exertional ability.    he does continue to have intermittent rare palpitations- these have decreased with beta-blockers  He was also started on atorvastatin which he is tolerating well.  Repeat lipid profile showed significant decrease in LDL.  No history of syncope with palpitations.    He has a significant family Hx of Premature CAD. Father MI at age 36.  CT calcium score was 0 in June 2022  He had echo in July 2023: Aortic root was 4.6 cm.  Normal EF.

## 2024-04-29 NOTE — DISCUSSION/SUMMARY
[FreeTextEntry1] : CHAO LEE is a 48 year old M who presents today with  the following active issues:   Palpitations: cont Metoprolol Succ 25mg daily.  Almost resolved.  Check ZIO recording for 3 days.  BP controlled on meds  Hx Dilated Aortic Root; Echo Aortic root was 4.6cm. It should be noted the patient is 6 foot 1 inch tall. Also MVP noted. Follow-up echocardiogram in July 2023 showed unchanged aortic root at 4.6 cm.  Normal EF.  Prolapse anterior mitral valve leaflet with mild MR. Follow-up echocardiogram in future.  HLD/Fam Hx CaD LDL 62 on previous labs. Cont Lipitor 20mg daily. Tolerating well.  There is mild elevation of LFT.  Asymptomatic.  The patient did take NSAIDs for the past 6 months due to pain in the neck quite frequently.  He will follow this up with Dr. Pelayo  Ct Calcium Score was 0 from 4/2022 Dietary modifications discussed in detail, weight loss and cardiovascular exercise.   **PREOP: The patient is scheduled to have disc replacement surgery in the neck.  He has good exertional ability.  He is able to go up 2-3 flight of steps without difficulty or walk briskly uphill.  He does not have any active cardiac symptoms.  No recent major cardiovascular events.  EKG on 4/24/2024 shows sinus rhythm, incomplete right bundle branch block, unremarkable.  Reviewed his CT calcium score and echocardiogram which were both unremarkable.  Overall, he should be low risk major cardiovascular events from the anticipated surgery.  He should continue his statin and metoprolol without interruption.   Thank you for this referral and allowing me to participate in the care of this patient.  If I can be of any further help or  if you have any questions, please do not hesitate to contact me   Sincerely,  Nick Huerta MD, FACC, RAMOS

## 2024-05-01 RX ORDER — TIZANIDINE 4 MG/1
1 TABLET ORAL
Qty: 30 | Refills: 0
Start: 2024-05-01 | End: 2024-05-10

## 2024-05-01 RX ORDER — OXYCODONE HYDROCHLORIDE 5 MG/1
1 TABLET ORAL
Qty: 30 | Refills: 0
Start: 2024-05-01 | End: 2024-05-05

## 2024-05-02 ENCOUNTER — NON-APPOINTMENT (OUTPATIENT)
Age: 49
End: 2024-05-02

## 2024-05-03 NOTE — PROVIDER CONTACT NOTE (OTHER) - ASSESSMENT
Pre-Operative Spine Education  The spine pre-operative education packet was mailed to the patient on 4/9/24. The patient reviewed the information included in the packet. He called the office and reviewed the information with the NP. All his questions were answered, and he gave a clear understanding of the instructions. He was advised to call the office at any time with further questions or concerns.

## 2024-05-08 ENCOUNTER — TRANSCRIPTION ENCOUNTER (OUTPATIENT)
Age: 49
End: 2024-05-08

## 2024-05-09 ENCOUNTER — OUTPATIENT (OUTPATIENT)
Dept: OUTPATIENT SERVICES | Facility: HOSPITAL | Age: 49
LOS: 1 days | End: 2024-05-09
Payer: OTHER GOVERNMENT

## 2024-05-09 ENCOUNTER — APPOINTMENT (OUTPATIENT)
Dept: ORTHOPEDIC SURGERY | Facility: HOSPITAL | Age: 49
End: 2024-05-09

## 2024-05-09 ENCOUNTER — RESULT REVIEW (OUTPATIENT)
Age: 49
End: 2024-05-09

## 2024-05-09 ENCOUNTER — TRANSCRIPTION ENCOUNTER (OUTPATIENT)
Age: 49
End: 2024-05-09

## 2024-05-09 VITALS
RESPIRATION RATE: 14 BRPM | HEIGHT: 73 IN | HEART RATE: 69 BPM | DIASTOLIC BLOOD PRESSURE: 77 MMHG | SYSTOLIC BLOOD PRESSURE: 109 MMHG | OXYGEN SATURATION: 98 % | WEIGHT: 224.87 LBS | TEMPERATURE: 98 F

## 2024-05-09 VITALS
OXYGEN SATURATION: 96 % | HEART RATE: 82 BPM | RESPIRATION RATE: 16 BRPM | DIASTOLIC BLOOD PRESSURE: 62 MMHG | SYSTOLIC BLOOD PRESSURE: 114 MMHG | TEMPERATURE: 98 F

## 2024-05-09 DIAGNOSIS — Z01.818 ENCOUNTER FOR OTHER PREPROCEDURAL EXAMINATION: ICD-10-CM

## 2024-05-09 DIAGNOSIS — M50.20 OTHER CERVICAL DISC DISPLACEMENT, UNSPECIFIED CERVICAL REGION: ICD-10-CM

## 2024-05-09 DIAGNOSIS — Z98.890 OTHER SPECIFIED POSTPROCEDURAL STATES: Chronic | ICD-10-CM

## 2024-05-09 LAB — ABO RH CONFIRMATION: SIGNIFICANT CHANGE UP

## 2024-05-09 PROCEDURE — 22856 TOT DISC ARTHRP 1NTRSPC CRV: CPT | Mod: 82

## 2024-05-09 PROCEDURE — 97161 PT EVAL LOW COMPLEX 20 MIN: CPT

## 2024-05-09 PROCEDURE — 22858 TOT DISC ARTHRP 2ND LVL CRV: CPT

## 2024-05-09 PROCEDURE — 22856 TOT DISC ARTHRP 1NTRSPC CRV: CPT

## 2024-05-09 PROCEDURE — C9399: CPT

## 2024-05-09 PROCEDURE — 36415 COLL VENOUS BLD VENIPUNCTURE: CPT

## 2024-05-09 PROCEDURE — 93244 EXT ECG>48HR<7D REV&INTERPJ: CPT

## 2024-05-09 PROCEDURE — 76000 FLUOROSCOPY <1 HR PHYS/QHP: CPT

## 2024-05-09 PROCEDURE — 88304 TISSUE EXAM BY PATHOLOGIST: CPT

## 2024-05-09 PROCEDURE — C1889: CPT

## 2024-05-09 PROCEDURE — 22858 TOT DISC ARTHRP 2ND LVL CRV: CPT | Mod: 82

## 2024-05-09 PROCEDURE — 88304 TISSUE EXAM BY PATHOLOGIST: CPT | Mod: 26

## 2024-05-09 PROCEDURE — C1713: CPT

## 2024-05-09 DEVICE — SURGIFLO HEMOSTATIC MATRIX KIT: Type: IMPLANTABLE DEVICE | Status: FUNCTIONAL

## 2024-05-09 DEVICE — BONE WAX 2.5GM: Type: IMPLANTABLE DEVICE | Status: FUNCTIONAL

## 2024-05-09 DEVICE — DISC MOBI-C CERV 15X17 H5: Type: IMPLANTABLE DEVICE | Status: FUNCTIONAL

## 2024-05-09 DEVICE — SURGIFOAM PAD 8CM X 12.5CM X 10MM (100): Type: IMPLANTABLE DEVICE | Status: FUNCTIONAL

## 2024-05-09 DEVICE — PIN DISTRACTOR CERV 14MMDISP: Type: IMPLANTABLE DEVICE | Status: FUNCTIONAL

## 2024-05-09 RX ORDER — NALOXONE HYDROCHLORIDE 4 MG/.1ML
4 SPRAY NASAL
Qty: 1 | Refills: 0
Start: 2024-05-09

## 2024-05-09 RX ORDER — DIAZEPAM 5 MG
1 TABLET ORAL
Qty: 20 | Refills: 0
Start: 2024-05-09

## 2024-05-09 RX ORDER — APREPITANT 80 MG/1
40 CAPSULE ORAL ONCE
Refills: 0 | Status: COMPLETED | OUTPATIENT
Start: 2024-05-09 | End: 2024-05-09

## 2024-05-09 RX ORDER — ONDANSETRON 8 MG/1
4 TABLET, FILM COATED ORAL ONCE
Refills: 0 | Status: DISCONTINUED | OUTPATIENT
Start: 2024-05-09 | End: 2024-05-09

## 2024-05-09 RX ORDER — SERTRALINE 25 MG/1
1.5 TABLET, FILM COATED ORAL
Refills: 0 | DISCHARGE

## 2024-05-09 RX ORDER — OXYCODONE HYDROCHLORIDE 5 MG/1
5 TABLET ORAL ONCE
Refills: 0 | Status: DISCONTINUED | OUTPATIENT
Start: 2024-05-09 | End: 2024-05-09

## 2024-05-09 RX ORDER — TRAZODONE HCL 50 MG
0 TABLET ORAL
Refills: 0 | DISCHARGE

## 2024-05-09 RX ORDER — HYDROMORPHONE HYDROCHLORIDE 2 MG/ML
0.2 INJECTION INTRAMUSCULAR; INTRAVENOUS; SUBCUTANEOUS
Refills: 0 | Status: DISCONTINUED | OUTPATIENT
Start: 2024-05-09 | End: 2024-05-09

## 2024-05-09 RX ORDER — ALPRAZOLAM 0.25 MG
1 TABLET ORAL
Refills: 0 | DISCHARGE

## 2024-05-09 RX ORDER — IBUPROFEN 200 MG
2 TABLET ORAL
Refills: 0 | DISCHARGE

## 2024-05-09 RX ORDER — METOPROLOL TARTRATE 50 MG
1 TABLET ORAL
Refills: 0 | DISCHARGE

## 2024-05-09 RX ORDER — HYDROMORPHONE HYDROCHLORIDE 2 MG/ML
0.5 INJECTION INTRAMUSCULAR; INTRAVENOUS; SUBCUTANEOUS
Refills: 0 | Status: DISCONTINUED | OUTPATIENT
Start: 2024-05-09 | End: 2024-05-09

## 2024-05-09 RX ORDER — OMEGA-3 ACID ETHYL ESTERS 1 G
2 CAPSULE ORAL
Refills: 0 | DISCHARGE

## 2024-05-09 RX ORDER — TRANEXAMIC ACID 100 MG/ML
2000 INJECTION, SOLUTION INTRAVENOUS ONCE
Refills: 0 | Status: COMPLETED | OUTPATIENT
Start: 2024-05-09 | End: 2024-05-09

## 2024-05-09 RX ORDER — CEFAZOLIN SODIUM 1 G
2000 VIAL (EA) INJECTION ONCE
Refills: 0 | Status: COMPLETED | OUTPATIENT
Start: 2024-05-09 | End: 2024-05-09

## 2024-05-09 RX ORDER — OXYCODONE HYDROCHLORIDE 5 MG/1
1 TABLET ORAL
Qty: 28 | Refills: 0
Start: 2024-05-09 | End: 2024-05-15

## 2024-05-09 RX ORDER — CALCIUM POLYCARBOPHIL 625 MG/1
2 TABLET, FILM COATED ORAL
Refills: 0 | DISCHARGE

## 2024-05-09 RX ORDER — ATORVASTATIN CALCIUM 80 MG/1
1 TABLET, FILM COATED ORAL
Refills: 0 | DISCHARGE

## 2024-05-09 RX ORDER — ACETAMINOPHEN 500 MG
1000 TABLET ORAL ONCE
Refills: 0 | Status: COMPLETED | OUTPATIENT
Start: 2024-05-09 | End: 2024-05-09

## 2024-05-09 RX ORDER — SODIUM CHLORIDE 9 MG/ML
1000 INJECTION, SOLUTION INTRAVENOUS
Refills: 0 | Status: DISCONTINUED | OUTPATIENT
Start: 2024-05-09 | End: 2024-05-09

## 2024-05-09 RX ADMIN — HYDROMORPHONE HYDROCHLORIDE 0.5 MILLIGRAM(S): 2 INJECTION INTRAMUSCULAR; INTRAVENOUS; SUBCUTANEOUS at 11:45

## 2024-05-09 RX ADMIN — HYDROMORPHONE HYDROCHLORIDE 0.5 MILLIGRAM(S): 2 INJECTION INTRAMUSCULAR; INTRAVENOUS; SUBCUTANEOUS at 11:30

## 2024-05-09 RX ADMIN — APREPITANT 40 MILLIGRAM(S): 80 CAPSULE ORAL at 06:42

## 2024-05-09 RX ADMIN — HYDROMORPHONE HYDROCHLORIDE 0.5 MILLIGRAM(S): 2 INJECTION INTRAMUSCULAR; INTRAVENOUS; SUBCUTANEOUS at 11:17

## 2024-05-09 RX ADMIN — OXYCODONE HYDROCHLORIDE 5 MILLIGRAM(S): 5 TABLET ORAL at 12:03

## 2024-05-09 NOTE — ASU PATIENT PROFILE, ADULT - NS PRO PASSIVE SMOKE EXP
Verified patient isn't in [place], [place] or [place]: YES  Informed V Visit will be submitted to insurance and approves: YES  Pre check in complete: YES    Chief Complaint:  Rox M Pato is here today for   Chief Complaint   Patient presents with   • Video Visit   • Follow-up     Med check         Medications: medications verified, no change  Refills needed today?  YES  denies Latex allergy or sensitivity  Tobacco history: verified  Health Maintenance Due   Topic Date Due   • Hepatitis B Vaccine (1 of 3 - 3-dose series) Never done   • Hepatitis C Screening  Never done   • COVID-19 Vaccine (5 - Booster for Moderna series) 01/03/2023     Patient is due for topics as listed above but is not proceeding with Immunization(s) COVID-19 and Hep B and Hepatitis C Screening at this time. .   Health Maintenance Summary     Hepatitis B Vaccine (1 of 3 - 3-dose series)  Overdue - never done    Hepatitis C Screening (Once)  Overdue - never done    COVID-19 Vaccine (5 - Booster for Moderna series)  Overdue since 1/3/2023    Cervical Cancer Screen 30-64 - (PAP/HPV Co-Testing - Every 5 Years)  Next due on 8/5/2027    DTaP/Tdap/Td Vaccine (7 - Td or Tdap)  Next due on 4/13/2028    Influenza Vaccine   Completed    Meningococcal Vaccine   Aged Out    HPV Vaccine   Aged Out    Pneumococcal Vaccine 0-64   Aged Out          Patient would like communication of their results via:        Cell Phone:   Telephone Information:   Mobile [phone]     Okay to leave a message containing results? Yes     No

## 2024-05-09 NOTE — ASU DISCHARGE PLAN (ADULT/PEDIATRIC) - ASU DC SPECIAL INSTRUCTIONSFT
keep wound clean and dry   no heavy lifting and twisting of neck  Leave soft neck collar for 3 days postop  follow up with Dr Daugherty in 7 days  Call office for any questions

## 2024-05-09 NOTE — PHYSICAL THERAPY INITIAL EVALUATION ADULT - NSACTIVITYREC_GEN_A_PT
Pt educated on spinal precautions. Pt is independent in transfers and gait training with no AD. Pt verbalized family available to assist as needed. Pt questions answered to pt satisfaction, verbalized understanding/agreement to edu/recommendations provided. Pt cleared from PT, no skilled PT needs upon D/C. Pt educated on spinal precautions/cervical collar. Pt is independent in transfers and amb with no AD. Pt edu on stair negotiation simulation with HR. Pt verbalized family available to assist as needed. Pt questions answered to pt satisfaction, verbalized understanding/agreement to edu/recommendations provided. Pt cleared from PT, no skilled PT needs upon D/C.

## 2024-05-09 NOTE — BRIEF OPERATIVE NOTE - NSICDXBRIEFPROCEDURE_GEN_ALL_CORE_FT
PROCEDURES:  Replacement of total spinal disc, cervical 09-May-2024 11:27:12 C5-6, C6-7 Janice Whipple

## 2024-05-09 NOTE — PHYSICAL THERAPY INITIAL EVALUATION ADULT - ADDITIONAL COMMENTS
Pt lives with girlfriend and children in pvt home, states available to assist as needed upon D/C. Pt reports 8 JONNY +1 handrail, full flight of stairs inside +1 handrail. Pt does not own any DME. Pt reports being independent prior to surgery.

## 2024-05-09 NOTE — PHYSICAL THERAPY INITIAL EVALUATION ADULT - PERTINENT HX OF CURRENT PROBLEM, REHAB EVAL
Pt is a 47 y/o male s/p total disc replacement C5-6 and C6-7, possible anterior discectomy and fusion C5-6, C6-7 with right or left iliac crest graft harvesting and any indicated procedures 05/09/2024

## 2024-05-09 NOTE — ASU DISCHARGE PLAN (ADULT/PEDIATRIC) - CARE PROVIDER_API CALL
Leone, Vincent Jean-Claude  Orthopaedic Surgery  [address], [address]  [place] [address]  Phone: [phone]  Fax: [phone]  Follow Up Time:

## 2024-05-09 NOTE — PHYSICAL THERAPY INITIAL EVALUATION ADULT - RANGE OF MOTION EXAMINATION, REHAB EVAL
cervical spine ROM impairment secondary to surgery/bilateral upper extremity ROM was WFL (within functional limits)/bilateral lower extremity ROM was WFL (within functional limits)

## 2024-05-09 NOTE — PHYSICAL THERAPY INITIAL EVALUATION ADULT - DID THE PATIENT HAVE SURGERY?
total disc replacement C5-6 and C6-7, possible anterior discectomy and fusion C5-6, C6-7 with right or left iliac crest graft harvesting and any indicated procedures/yes

## 2024-05-09 NOTE — ASU DISCHARGE PLAN (ADULT/PEDIATRIC) - NS MD DC FALL RISK RISK
For information on Fall & Injury Prevention, visit: [email or web address] OR  [email or web address] OR  https://www.cdc.gov/steadi/patient.html

## 2024-05-10 LAB — SURGICAL PATHOLOGY STUDY: SIGNIFICANT CHANGE UP

## 2024-05-22 ENCOUNTER — RX RENEWAL (OUTPATIENT)
Age: 49
End: 2024-05-22

## 2024-05-23 ENCOUNTER — APPOINTMENT (OUTPATIENT)
Dept: ORTHOPEDIC SURGERY | Facility: CLINIC | Age: 49
End: 2024-05-23

## 2024-06-21 RX ORDER — ATORVASTATIN CALCIUM 20 MG/1
20 TABLET, FILM COATED ORAL
Qty: 30 | Refills: 0 | Status: ACTIVE | COMMUNITY
Start: 2022-05-10 | End: 1900-01-01

## 2024-10-21 ENCOUNTER — APPOINTMENT (OUTPATIENT)
Dept: CARDIOLOGY | Facility: CLINIC | Age: 49
End: 2024-10-21

## 2024-10-28 ENCOUNTER — APPOINTMENT (OUTPATIENT)
Dept: CARDIOLOGY | Facility: CLINIC | Age: 49
End: 2024-10-28
Payer: OTHER GOVERNMENT

## 2024-10-28 VITALS
OXYGEN SATURATION: 98 % | WEIGHT: 235 LBS | HEIGHT: 73 IN | BODY MASS INDEX: 31.14 KG/M2 | DIASTOLIC BLOOD PRESSURE: 76 MMHG | RESPIRATION RATE: 14 BRPM | SYSTOLIC BLOOD PRESSURE: 122 MMHG | HEART RATE: 79 BPM

## 2024-10-28 DIAGNOSIS — G47.30 SLEEP APNEA, UNSPECIFIED: ICD-10-CM

## 2024-10-28 DIAGNOSIS — I49.9 CARDIAC ARRHYTHMIA, UNSPECIFIED: ICD-10-CM

## 2024-10-28 DIAGNOSIS — E78.5 HYPERLIPIDEMIA, UNSPECIFIED: ICD-10-CM

## 2024-10-28 DIAGNOSIS — I51.7 CARDIOMEGALY: ICD-10-CM

## 2024-10-28 DIAGNOSIS — I77.810 THORACIC AORTIC ECTASIA: ICD-10-CM

## 2024-10-28 DIAGNOSIS — Z86.59 PERSONAL HISTORY OF OTHER MENTAL AND BEHAVIORAL DISORDERS: ICD-10-CM

## 2024-10-28 DIAGNOSIS — R00.2 PALPITATIONS: ICD-10-CM

## 2024-10-28 DIAGNOSIS — Z87.09 PERSONAL HISTORY OF OTHER DISEASES OF THE RESPIRATORY SYSTEM: ICD-10-CM

## 2024-10-28 DIAGNOSIS — F41.1 GENERALIZED ANXIETY DISORDER: ICD-10-CM

## 2024-10-28 PROCEDURE — G2211 COMPLEX E/M VISIT ADD ON: CPT

## 2024-10-28 PROCEDURE — 99214 OFFICE O/P EST MOD 30 MIN: CPT

## 2024-10-28 PROCEDURE — 93000 ELECTROCARDIOGRAM COMPLETE: CPT

## 2024-10-31 LAB — HBA1C MFR BLD HPLC: 5.7

## 2024-11-21 ENCOUNTER — APPOINTMENT (OUTPATIENT)
Dept: CARDIOLOGY | Facility: CLINIC | Age: 49
End: 2024-11-21
Payer: OTHER GOVERNMENT

## 2024-11-21 PROCEDURE — 93978 VASCULAR STUDY: CPT

## 2024-11-21 PROCEDURE — 93306 TTE W/DOPPLER COMPLETE: CPT

## 2024-12-13 ENCOUNTER — APPOINTMENT (OUTPATIENT)
Dept: CARDIOLOGY | Facility: CLINIC | Age: 49
End: 2024-12-13
Payer: OTHER GOVERNMENT

## 2024-12-13 VITALS
DIASTOLIC BLOOD PRESSURE: 82 MMHG | HEART RATE: 76 BPM | OXYGEN SATURATION: 98 % | WEIGHT: 238 LBS | BODY MASS INDEX: 31.4 KG/M2 | SYSTOLIC BLOOD PRESSURE: 132 MMHG

## 2024-12-13 DIAGNOSIS — I77.810 THORACIC AORTIC ECTASIA: ICD-10-CM

## 2024-12-13 PROCEDURE — 99214 OFFICE O/P EST MOD 30 MIN: CPT

## 2025-03-28 ENCOUNTER — TRANSCRIPTION ENCOUNTER (OUTPATIENT)
Age: 50
End: 2025-03-28

## 2025-05-14 ENCOUNTER — OFFICE (OUTPATIENT)
Dept: URBAN - METROPOLITAN AREA CLINIC 12 | Facility: CLINIC | Age: 50
Setting detail: OPHTHALMOLOGY
End: 2025-05-14
Payer: OTHER GOVERNMENT

## 2025-05-14 DIAGNOSIS — H25.13: ICD-10-CM

## 2025-05-14 DIAGNOSIS — H18.613: ICD-10-CM

## 2025-05-14 PROCEDURE — 99204 OFFICE O/P NEW MOD 45 MIN: CPT | Performed by: OPHTHALMOLOGY

## 2025-05-14 PROCEDURE — 92025 CPTRIZED CORNEAL TOPOGRAPHY: CPT | Performed by: OPHTHALMOLOGY

## 2025-05-14 ASSESSMENT — CONFRONTATIONAL VISUAL FIELD TEST (CVF)
OS_FINDINGS: FULL
OD_FINDINGS: FULL

## 2025-05-14 ASSESSMENT — REFRACTION_CURRENTRX
OS_VPRISM_DIRECTION: SV
OS_CYLINDER: -5.50
OD_SPHERE: -1.75
OD_CYLINDER: -2.25
OS_SPHERE: -3.50
OD_AXIS: 075
OD_OVR_VA: 20/
OS_AXIS: 088
OD_VPRISM_DIRECTION: SV
OS_OVR_VA: 20/

## 2025-05-14 ASSESSMENT — REFRACTION_MANIFEST
OD_SPHERE: -1.25
OS_CYLINDER: -5.75
OD_CYLINDER: -2.75
OS_SPHERE: -2.75
OD_VA1: 20/20-2
OS_AXIS: 095
OD_AXIS: 075
OS_VA1: 20/NI

## 2025-05-14 ASSESSMENT — REFRACTION_AUTOREFRACTION
OD_SPHERE: -1.25
OS_SPHERE: -2.75
OD_CYLINDER: -2.75
OS_AXIS: 094
OD_AXIS: 076
OS_CYLINDER: -5.75

## 2025-05-14 ASSESSMENT — KERATOMETRY
OS_K2POWER_DIOPTERS: 49.75
OD_K2POWER_DIOPTERS: 43.50
OS_K1POWER_DIOPTERS: 43.25
OD_AXISANGLE_DEGREES: 166
OS_AXISANGLE_DEGREES: 180
OD_K1POWER_DIOPTERS: 41.50

## 2025-05-14 ASSESSMENT — TONOMETRY
OD_IOP_MMHG: 12
OS_IOP_MMHG: 12

## 2025-05-14 ASSESSMENT — VISUAL ACUITY
OS_BCVA: 20/25-1
OD_BCVA: 20/70+1

## 2025-05-21 ENCOUNTER — OFFICE (OUTPATIENT)
Dept: URBAN - METROPOLITAN AREA CLINIC 12 | Facility: CLINIC | Age: 50
Setting detail: OPHTHALMOLOGY
End: 2025-05-21
Payer: OTHER GOVERNMENT

## 2025-05-21 DIAGNOSIS — H25.13: ICD-10-CM

## 2025-05-21 DIAGNOSIS — H25.12: ICD-10-CM

## 2025-05-21 PROCEDURE — 92136 OPHTHALMIC BIOMETRY: CPT | Mod: LT | Performed by: OPHTHALMOLOGY

## 2025-05-21 PROCEDURE — 99213 OFFICE O/P EST LOW 20 MIN: CPT | Performed by: OPHTHALMOLOGY

## 2025-05-21 PROCEDURE — 92136 OPHTHALMIC BIOMETRY: CPT | Mod: TC | Performed by: OPHTHALMOLOGY

## 2025-05-21 ASSESSMENT — CONFRONTATIONAL VISUAL FIELD TEST (CVF)
OS_FINDINGS: FULL
OD_FINDINGS: FULL

## 2025-05-21 ASSESSMENT — TONOMETRY
OD_IOP_MMHG: 17
OS_IOP_MMHG: 18

## 2025-05-22 ASSESSMENT — REFRACTION_AUTOREFRACTION
OD_SPHERE: -1.75
OS_SPHERE: -3.25
OD_AXIS: 080
OD_CYLINDER: -2.75
OS_CYLINDER: -5.25
OS_AXIS: 094

## 2025-05-22 ASSESSMENT — REFRACTION_MANIFEST
OS_VA1: 20/NI
OD_VA1: 20/20-2
OS_SPHERE: -2.75
OD_CYLINDER: -2.75
OS_CYLINDER: -5.75
OS_AXIS: 095
OD_SPHERE: -1.25
OD_AXIS: 075

## 2025-05-22 ASSESSMENT — KERATOMETRY
OS_AXISANGLE_DEGREES: 004
METHOD_AUTO_MANUAL: AUTO
OD_K2POWER_DIOPTERS: 43.50
OS_K2POWER_DIOPTERS: 49.75
OS_K1POWER_DIOPTERS: 43.75
OD_AXISANGLE_DEGREES: 170
OD_K1POWER_DIOPTERS: 41.25

## 2025-05-22 ASSESSMENT — REFRACTION_CURRENTRX
OD_CYLINDER: -2.25
OS_SPHERE: -3.50
OS_VPRISM_DIRECTION: SV
OD_SPHERE: -1.75
OD_VPRISM_DIRECTION: SV
OS_OVR_VA: 20/
OS_AXIS: 088
OD_AXIS: 075
OS_CYLINDER: -5.50
OD_OVR_VA: 20/

## 2025-05-22 ASSESSMENT — VISUAL ACUITY
OD_BCVA: 20/60
OS_BCVA: 20/20-1

## 2025-05-23 ENCOUNTER — OFFICE (OUTPATIENT)
Dept: URBAN - METROPOLITAN AREA CLINIC 12 | Facility: CLINIC | Age: 50
Setting detail: OPHTHALMOLOGY
End: 2025-05-23
Payer: OTHER GOVERNMENT

## 2025-05-23 DIAGNOSIS — H25.13: ICD-10-CM

## 2025-05-23 DIAGNOSIS — Z01.818: ICD-10-CM

## 2025-05-23 PROBLEM — H18.613 KERATOCONUS, STABLE; BOTH EYES: Status: ACTIVE | Noted: 2025-05-14

## 2025-05-23 PROCEDURE — 99212 OFFICE O/P EST SF 10 MIN: CPT

## 2025-06-03 ENCOUNTER — ASC (OUTPATIENT)
Dept: URBAN - METROPOLITAN AREA SURGERY 8 | Facility: SURGERY | Age: 50
Setting detail: OPHTHALMOLOGY
End: 2025-06-03
Payer: OTHER GOVERNMENT

## 2025-06-03 DIAGNOSIS — H52.222: ICD-10-CM

## 2025-06-03 DIAGNOSIS — H25.12: ICD-10-CM

## 2025-06-03 PROCEDURE — 66984 XCAPSL CTRC RMVL W/O ECP: CPT | Mod: LT | Performed by: OPHTHALMOLOGY

## 2025-06-03 PROCEDURE — FEMTO PRECISION LASER CATARACT SURGERY: Mod: GY | Performed by: OPHTHALMOLOGY

## 2025-06-03 PROCEDURE — V2787 ASTIGMATISM-CORRECT FUNCTION: HCPCS | Performed by: OPHTHALMOLOGY

## 2025-06-04 ENCOUNTER — RX ONLY (RX ONLY)
Age: 50
End: 2025-06-04

## 2025-06-04 ENCOUNTER — OFFICE (OUTPATIENT)
Dept: URBAN - METROPOLITAN AREA CLINIC 12 | Facility: CLINIC | Age: 50
Setting detail: OPHTHALMOLOGY
End: 2025-06-04
Payer: OTHER GOVERNMENT

## 2025-06-04 DIAGNOSIS — H25.11: ICD-10-CM

## 2025-06-04 PROCEDURE — 92136 OPHTHALMIC BIOMETRY: CPT | Mod: RT | Performed by: OPHTHALMOLOGY

## 2025-06-04 ASSESSMENT — REFRACTION_AUTOREFRACTION
OD_SPHERE: -2.00
OS_SPHERE: +0.75
OS_AXIS: 111
OD_CYLINDER: -2.50
OD_AXIS: 080
OS_CYLINDER: -0.25

## 2025-06-04 ASSESSMENT — KERATOMETRY
OS_AXISANGLE_DEGREES: 003
OD_AXISANGLE_DEGREES: 170
OS_K1POWER_DIOPTERS: 42.50
OD_K2POWER_DIOPTERS: 43.50
OS_K2POWER_DIOPTERS: 47.50
OD_K1POWER_DIOPTERS: 41.50

## 2025-06-04 ASSESSMENT — TONOMETRY
OS_IOP_MMHG: 18
OD_IOP_MMHG: 18

## 2025-06-04 ASSESSMENT — CONFRONTATIONAL VISUAL FIELD TEST (CVF)
OD_FINDINGS: FULL
OS_FINDINGS: FULL

## 2025-06-04 ASSESSMENT — VISUAL ACUITY
OD_BCVA: 20/30
OS_BCVA: 20/125

## 2025-06-10 ENCOUNTER — ASC (OUTPATIENT)
Dept: URBAN - METROPOLITAN AREA SURGERY 8 | Facility: SURGERY | Age: 50
Setting detail: OPHTHALMOLOGY
End: 2025-06-10
Payer: OTHER GOVERNMENT

## 2025-06-10 DIAGNOSIS — H52.221: ICD-10-CM

## 2025-06-10 DIAGNOSIS — H25.11: ICD-10-CM

## 2025-06-10 PROCEDURE — 66984 XCAPSL CTRC RMVL W/O ECP: CPT | Mod: 79,RT | Performed by: OPHTHALMOLOGY

## 2025-06-10 PROCEDURE — V2787 ASTIGMATISM-CORRECT FUNCTION: HCPCS | Performed by: OPHTHALMOLOGY

## 2025-06-10 PROCEDURE — FEMTO PRECISION LASER CATARACT SURGERY: Mod: GY | Performed by: OPHTHALMOLOGY

## 2025-06-11 ENCOUNTER — OFFICE (OUTPATIENT)
Dept: URBAN - METROPOLITAN AREA CLINIC 12 | Facility: CLINIC | Age: 50
Setting detail: OPHTHALMOLOGY
End: 2025-06-11
Payer: OTHER GOVERNMENT

## 2025-06-11 DIAGNOSIS — Z96.1: ICD-10-CM

## 2025-06-11 PROCEDURE — 99024 POSTOP FOLLOW-UP VISIT: CPT | Performed by: OPHTHALMOLOGY

## 2025-06-11 ASSESSMENT — REFRACTION_AUTOREFRACTION
OD_SPHERE: +0.50
OS_SPHERE: +0.50
OD_CYLINDER: -0.75
OS_CYLINDER: -0.50
OD_AXIS: 161
OS_AXIS: 120

## 2025-06-11 ASSESSMENT — VISUAL ACUITY
OS_BCVA: 20/20-3
OD_BCVA: 20/60-2

## 2025-06-11 ASSESSMENT — KERATOMETRY
OS_K2POWER_DIOPTERS: 50.00
OD_AXISANGLE_DEGREES: 170
OD_K1POWER_DIOPTERS: 41.50
OS_K1POWER_DIOPTERS: 43.75
OS_AXISANGLE_DEGREES: 179
OD_K2POWER_DIOPTERS: 43.00

## 2025-06-11 ASSESSMENT — CONFRONTATIONAL VISUAL FIELD TEST (CVF)
OS_FINDINGS: FULL
OD_FINDINGS: FULL

## 2025-06-11 ASSESSMENT — TONOMETRY: OS_IOP_MMHG: 13

## (undated) DEVICE — DRAPE C ARM UNIVERSAL

## (undated) DEVICE — STAPLER SKIN VISI-STAT 35 WIDE

## (undated) DEVICE — DRAPE MICROSCOPE LEICA 26" X 150"

## (undated) DEVICE — VENODYNE/SCD SLEEVE CALF MEDIUM

## (undated) DEVICE — PACK BASIC TIBURON LTXF STRL

## (undated) DEVICE — SPECIMEN CONTAINER 4OZ

## (undated) DEVICE — ELCTR STRYKER NEPTUNE SMOKE EVACUATION PENCIL (GREEN)

## (undated) DEVICE — SUT VICRYL 2-0 18" CP-2 UNDYED (POP-OFF)

## (undated) DEVICE — CERVICAL COLLAR ZIMMER PHILA MED

## (undated) DEVICE — FOLEY TRAY 14FR 5CC LF BAG CLOSED

## (undated) DEVICE — KYPHON BONE BIOPSY DEVICE SIZE 3

## (undated) DEVICE — SPONGE PEANUT AUTO COUNT

## (undated) DEVICE — POSITIONER STRAP ARMBOARD VELCRO TS-30

## (undated) DEVICE — MIDAS REX MR8 MATCH HEAD FLUTED LG BORE 3MM X 14CM

## (undated) DEVICE — SOL IRR POUR H2O 1500ML

## (undated) DEVICE — HALTER HEAD

## (undated) DEVICE — SPECIMEN CONTAINER 100ML

## (undated) DEVICE — CERVICAL COLLAR ZIMMER PHILA LG

## (undated) DEVICE — GLV 7 PROTEXIS (WHITE)

## (undated) DEVICE — SUT MONOCRYL 3-0 27" PS-2 UNDYED

## (undated) DEVICE — GLV 6.5 PROTEXIS (WHITE)

## (undated) DEVICE — SOLIDIFIER CANN EXPRESS 3K

## (undated) DEVICE — PACK SPINE

## (undated) DEVICE — DRAPE COVER TABLE 44X75"

## (undated) DEVICE — GLV 8 PROTEXIS (WHITE)

## (undated) DEVICE — CATH ANGIOCATH 12G

## (undated) DEVICE — SPECIMEN CONTAINER PET

## (undated) DEVICE — DRAPE MAYO STAND 30"

## (undated) DEVICE — VENODYNE/SCD SLEEVE CALF LARGE

## (undated) DEVICE — SOL IRR POUR NS 0.9% 500ML

## (undated) DEVICE — BASIN SET SINGLE

## (undated) DEVICE — WARMING BLANKET LOWER ADULT

## (undated) DEVICE — PREP DURAPREP 26CC

## (undated) DEVICE — SUT MONOSOF 2-0 18" C-15

## (undated) DEVICE — SUT SOFSILK 3-0 18" TIES

## (undated) DEVICE — NDL SPINAL 18G X 6"

## (undated) DEVICE — GLV 8.5 PROTEXIS (WHITE)

## (undated) DEVICE — DRSG STERISTRIPS 0.5 X 4"

## (undated) DEVICE — ELCTR BOVIE TIP BLADE INSULATED 2.75" EDGE

## (undated) DEVICE — GLV 7.5 PROTEXIS (WHITE)

## (undated) DEVICE — CANISTER SUCTION LID GUARD 3000CC